# Patient Record
Sex: FEMALE | Race: WHITE | Employment: UNEMPLOYED | URBAN - METROPOLITAN AREA
[De-identification: names, ages, dates, MRNs, and addresses within clinical notes are randomized per-mention and may not be internally consistent; named-entity substitution may affect disease eponyms.]

---
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
## 2019-09-26 ENCOUNTER — OFFICE VISIT (OUTPATIENT)
Dept: OBGYN CLINIC | Age: 19
End: 2019-09-26

## 2019-09-26 VITALS
SYSTOLIC BLOOD PRESSURE: 139 MMHG | BODY MASS INDEX: 39.7 KG/M2 | HEIGHT: 66 IN | HEART RATE: 62 BPM | WEIGHT: 247 LBS | DIASTOLIC BLOOD PRESSURE: 72 MMHG

## 2019-09-26 DIAGNOSIS — N94.3 PMS (PREMENSTRUAL SYNDROME): ICD-10-CM

## 2019-09-26 DIAGNOSIS — Z01.419 ENCOUNTER FOR GYNECOLOGICAL EXAMINATION: Primary | ICD-10-CM

## 2019-09-26 DIAGNOSIS — N94.6 DYSMENORRHEA: ICD-10-CM

## 2019-09-26 DIAGNOSIS — Z11.3 SCREENING EXAMINATION FOR VENEREAL DISEASE: ICD-10-CM

## 2019-09-26 PROBLEM — E66.01 SEVERE OBESITY (HCC): Status: ACTIVE | Noted: 2019-09-26

## 2019-09-26 RX ORDER — LEVOTHYROXINE SODIUM 50 UG/1
TABLET ORAL
COMMUNITY
Start: 2019-09-24

## 2019-09-26 NOTE — PATIENT INSTRUCTIONS
Well Visit, Ages 25 to 48: Care Instructions  Your Care Instructions    Physical exams can help you stay healthy. Your doctor has checked your overall health and may have suggested ways to take good care of yourself. He or she also may have recommended tests. At home, you can help prevent illness with healthy eating, regular exercise, and other steps. Follow-up care is a key part of your treatment and safety. Be sure to make and go to all appointments, and call your doctor if you are having problems. It's also a good idea to know your test results and keep a list of the medicines you take. How can you care for yourself at home? · Reach and stay at a healthy weight. This will lower your risk for many problems, such as obesity, diabetes, heart disease, and high blood pressure. · Get at least 30 minutes of physical activity on most days of the week. Walking is a good choice. You also may want to do other activities, such as running, swimming, cycling, or playing tennis or team sports. Discuss any changes in your exercise program with your doctor. · Do not smoke or allow others to smoke around you. If you need help quitting, talk to your doctor about stop-smoking programs and medicines. These can increase your chances of quitting for good. · Talk to your doctor about whether you have any risk factors for sexually transmitted infections (STIs). Having one sex partner (who does not have STIs and does not have sex with anyone else) is a good way to avoid these infections. · Use birth control if you do not want to have children at this time. Talk with your doctor about the choices available and what might be best for you. · Protect your skin from too much sun. When you're outdoors from 10 a.m. to 4 p.m., stay in the shade or cover up with clothing and a hat with a wide brim. Wear sunglasses that block UV rays. Even when it's cloudy, put broad-spectrum sunscreen (SPF 30 or higher) on any exposed skin.   · See a dentist one or two times a year for checkups and to have your teeth cleaned. · Wear a seat belt in the car. Follow your doctor's advice about when to have certain tests. These tests can spot problems early. For everyone  · Cholesterol. Have the fat (cholesterol) in your blood tested after age 21. Your doctor will tell you how often to have this done based on your age, family history, or other things that can increase your risk for heart disease. · Blood pressure. Have your blood pressure checked during a routine doctor visit. Your doctor will tell you how often to check your blood pressure based on your age, your blood pressure results, and other factors. · Vision. Talk with your doctor about how often to have a glaucoma test.  · Diabetes. Ask your doctor whether you should have tests for diabetes. · Colon cancer. Your risk for colorectal cancer gets higher as you get older. Some experts say that adults should start regular screening at age 48 and stop at age 76. Others say to start before age 48 or continue after age 76. Talk with your doctor about your risk and when to start and stop screening. For women  · Breast exam and mammogram. Talk to your doctor about when you should have a clinical breast exam and a mammogram. Medical experts differ on whether and how often women under 50 should have these tests. Your doctor can help you decide what is right for you. · Cervical cancer screening test and pelvic exam. Begin with a Pap test at age 24. The test often is part of a pelvic exam. Starting at age 27, you may choose to have a Pap test, an HPV test, or both tests at the same time (called co-testing). Talk with your doctor about how often to have testing. · Tests for sexually transmitted infections (STIs). Ask whether you should have tests for STIs. You may be at risk if you have sex with more than one person, especially if your partners do not wear condoms.   For men  · Tests for sexually transmitted infections (STIs). Ask whether you should have tests for STIs. You may be at risk if you have sex with more than one person, especially if you do not wear a condom. · Testicular cancer exam. Ask your doctor whether you should check your testicles regularly. · Prostate exam. Talk to your doctor about whether you should have a blood test (called a PSA test) for prostate cancer. Experts differ on whether and when men should have this test. Some experts suggest it if you are older than 39 and are -American or have a father or brother who got prostate cancer when he was younger than 72. When should you call for help? Watch closely for changes in your health, and be sure to contact your doctor if you have any problems or symptoms that concern you. Where can you learn more? Go to http://meghan-alis.info/. Enter P072 in the search box to learn more about \"Well Visit, Ages 25 to 48: Care Instructions. \"  Current as of: December 13, 2018  Content Version: 12.2  © 4081-0197 [employer]. Care instructions adapted under license by [employer] (which disclaims liability or warranty for this information). If you have questions about a medical condition or this instruction, always ask your healthcare professional. [address] any warranty or liability for your use of this information.   MyChart Help Desk: [phone]

## 2019-09-26 NOTE — PROGRESS NOTES
Annual exam ages 21-44  (NEW PATIENT)    Samantha Szymanski is a [de-identified] P5,  22 y.o. female [facility] Patient's last menstrual period was 2019 (exact date). , who presents for her annual checkup. This is patient's first GYN Exam.     ADDITIONAL CONCERNS:  She is having no significant problems. With regard to the Gardasil vaccine, she has not received it yet. Menstrual status:    Her periods are light, moderate in flow. She using pads or tampons per day, and changing them every 3-4 hrs., usually regular and last 26-30 days. Menarche at 15-15 yo. She has dysmenorrhea. She has premenstrual symptoms - mood swings, breast tenderness. Contraception:    The current method of family planning is condoms most of the time. Sexual history:     She  reports that she currently engages in sexual activity and has had partner(s) who are Male. She reports using the following method of birth control/protection: Condom. .        Pap and Mammogram History:    Has never had a pap smear prior to today. The patient has never had a mammogram.    Breast Cancer History/Substance Abuse: Negative     Past Medical History:   Diagnosis Date    HPV vaccine counseling     Never rec'd Gardasil series    Hypothyroidism 2018     History reviewed. No pertinent surgical history. OB History    Para Term  AB Living   0 0 0 0 0 0   SAB TAB Ectopic Molar Multiple Live Births   0 0 0 0 0 0   Obstetric Comments   Menarche at age 13 or 13yrs old. Current Outpatient Medications   Medication Sig Dispense Refill    levothyroxine (SYNTHROID) 50 mcg tablet        Allergies: Patient has no known allergies.    Social History     Socioeconomic History    Marital status: SINGLE     Spouse name: Not on file    Number of children: Not on file    Years of education: Not on file    Highest education level: Not on file   Occupational History    Not on file   Social Needs    Financial resource strain: Not on file    Food insecurity:     Worry: Not on file     Inability: Not on file    Transportation needs:     Medical: Not on file     Non-medical: Not on file   Tobacco Use    Smoking status: Never Smoker    Smokeless tobacco: Never Used    Tobacco comment: Denies vaping   Substance and Sexual Activity    Alcohol use: Yes    Drug use: Never    Sexual activity: Yes     Partners: Male     Birth control/protection: Condom   Lifestyle    Physical activity:     Days per week: Not on file     Minutes per session: Not on file    Stress: Not on file   Relationships    Social connections:     Talks on phone: Not on file     Gets together: Not on file     Attends Cheondoism service: Not on file     Active member of club or organization: Not on file     Attends meetings of clubs or organizations: Not on file     Relationship status: Not on file    Intimate partner violence:     Fear of current or ex partner: Not on file     Emotionally abused: Not on file     Physically abused: Not on file     Forced sexual activity: Not on file   Other Topics Concern    Not on file   Social History Narrative    Not on file     Tobacco History:  reports that she has never smoked. She has never used smokeless tobacco.  Alcohol Abuse:  reports that she drinks alcohol. Drug Abuse:  reports that she does not use drugs.     Patient Active Problem List   Diagnosis Code    Hypothyroidism E03.9    Severe obesity ([facility] [address] E66.01     Family History   Problem Relation Age of Onset    Diabetes Mother     Ovarian Cancer Paternal Grandmother          at 59yo    Emphysema Paternal Grandmother        Review of Systems - History obtained from the patient  Constitutional: negative for weight loss, fever, night sweats  HEENT: negative for hearing loss, earache, congestion, snoring, sorethroat  CV: negative for chest pain, palpitations, edema  Resp: negative for cough, shortness of breath, wheezing  GI: negative for change in bowel habits, abdominal pain, black or bloody stools  : negative for frequency, dysuria, hematuria, vaginal discharge  MSK: negative for back pain, joint pain, muscle pain  Breast: negative for breast lumps, nipple discharge, galactorrhea  Skin :negative for itching, rash, hives  Neuro: negative for dizziness, headache, confusion, weakness  Psych: negative for anxiety, depression, change in mood  Heme/lymph: negative for bleeding, bruising, pallor    Physical Exam    Visit Vitals  /72 (BP 1 Location: Right arm, BP Patient Position: Sitting)   Pulse 62   Ht 5' 6\" (1.676 m)   Wt 247 lb (112 kg)   LMP 09/12/2019 (Exact Date)   BMI 39.87 kg/m²       Constitutional  · Appearance: well-nourished, well developed, alert, in no acute distress    HENT  · Head and Face: appears normal    Neck  · Inspection/Palpation: normal appearance, no masses or tenderness  · Lymph Nodes: no lymphadenopathy present  · Thyroid: gland size normal, nontender, no nodules or masses present on palpation    Chest  · Respiratory Effort: breathing unlabored  · Auscultation: normal breath sounds    Cardiovascular  · Heart:  · Auscultation: regular rate and rhythm without murmur    Breasts  · Inspection of Breasts: breasts symmetrical, no skin changes, no discharge present, nipple appearance normal, no skin retraction present  · Palpation of Breasts and Axillae: no masses present on palpation, no breast tenderness  · Axillary Lymph Nodes: no lymphadenopathy present    Gastrointestinal  · Abdominal Examination: abdomen non-tender to palpation, normal bowel sounds, no masses present  · Liver and spleen: no hepatomegaly present, spleen not palpable  · Hernias: no hernias identified    Genitourinary  · External Genitalia: normal appearance for age, no discharge present, no tenderness present, no inflammatory lesions present, no masses present, no atrophy present  · Vagina: normal vaginal vault without central or paravaginal defects, no discharge present, no inflammatory lesions present, no masses present  · Bladder: non-tender to palpation  · Urethra: appears normal  · Cervix: normal   · Uterus: normal size, shape and consistency  · Adnexa: no adnexal tenderness present, no adnexal masses present  · Perineum: perineum within normal limits, no evidence of trauma, no rashes or skin lesions present  · Anus: anus within normal limits, no hemorrhoids present  · Inguinal Lymph Nodes: no lymphadenopathy present    Skin  · General Inspection: no rash, no lesions identified    Neurologic/Psychiatric  · Mental Status:  · Orientation: grossly oriented to person, place and time  · Mood and Affect: mood normal, affect appropriate        Assessment & Plan:  · Routine gynecologic examination. Pap done. · NuSwab screening <26yo. · Dysmenorrhea, PMS. Interested in [address]. [address]3 no RF. Risk and benefits reviewed, including thromboembolic events. 1st Sunday start, backup method until second pack, continue for STD prevention. Handout given. RTO 3months for f/u. · At end of visit, mentioned \"white spot\" on labia (she forgot to mention while taking hx, did not notice at time of exam). Has picture on her phone that she showed me -- blocked duct, possible sebaceous cyst.  Rec heat, sitz baths. Can reassess when she returns for f/u. · Her current medical status is satisfactory with no evidence of significant gynecologic issues. · Counseled re: diet, exercise, healthy lifestyle  · Return for yearly wellness visits  · Gardisil counseling provided. Declines today, she will consider. · Patient verbalized understanding      Orders Placed This Encounter    CT/NG/T.VAGINALIS AMPLIFICATION     Order Specific Question:   Specimen source     Answer:   Vaginal [516]    norethindrone-e estradiol-iron (JUNEL FE 24) 1 mg-20 mcg (24)/75 mg (4) tab     Sig: Take 1 Tab by mouth daily.      Dispense:  3 Package     Refill:  0    PAP, IG, RFX HPV ASCUS ([id number])

## 2019-09-30 LAB
C TRACH RRNA SPEC QL NAA+PROBE: NEGATIVE
N GONORRHOEA RRNA SPEC QL NAA+PROBE: NEGATIVE
T VAGINALIS DNA SPEC QL NAA+PROBE: NEGATIVE

## 2019-10-04 PROBLEM — R87.619 ABNORMAL PAP SMEAR OF CERVIX: Status: ACTIVE | Noted: 2019-10-04

## 2019-10-04 LAB
CYTOLOGIST CVX/VAG CYTO: ABNORMAL
CYTOLOGY CVX/VAG DOC CYTO: ABNORMAL
CYTOLOGY CVX/VAG DOC THIN PREP: ABNORMAL
DX ICD CODE: ABNORMAL
DX ICD CODE: ABNORMAL
LABCORP, 190119: ABNORMAL
Lab: ABNORMAL
OTHER STN SPEC: ABNORMAL
PATHOLOGIST CVX/VAG CYTO: ABNORMAL
STAT OF ADQ CVX/VAG CYTO-IMP: ABNORMAL

## 2019-12-19 NOTE — PROGRESS NOTES
Contraception evaluation/followup    The patient is a 22 y.o.,  Patient's last menstrual period was 2019 (approximate). .     She is here for contraceptive counseling/folllow up. Her current method of family planning is OCP (estrogen/progesterone). The patient is sexually active. She states she is satisfied with her current form of contraception. Sen for AE in Sept.  Reported dysmenorrhea, PMS sx. Started on Junel 24. Doing well. Had some BTB first 1.5 packs -> resolved. No N/V. H/o HA prior to OCPs, no change. Would like to continue. At end of AE (after exam completed) in Sept, reported white vaginal bump. Told probably sebaceous cyst.  Adv sitz baths. Has done occasionally, but not consistently. Asymptomatic, has not changed in size. Her previous menses she describes as moderate lasting up to 6 days. Pad or tampon count: changes every 4 hours. Preventive Medicine History: Last Pap smear:patient has never had a pap test.     Past Medical History:   Diagnosis Date    Abnormal Pap smear of cervix 2019    LGSIL -> Rpt pap in 1yr     HPV vaccine counseling     Never rec'd Gardasil series    Hypothyroidism 2018        History reviewed. No pertinent surgical history. Social History     Occupational History    Not on file   Tobacco Use    Smoking status: Never Smoker    Smokeless tobacco: Never Used    Tobacco comment: Denies vaping   Substance and Sexual Activity    Alcohol use: Yes    Drug use: Never    Sexual activity: Yes     Partners: Male     Birth control/protection: Condom     Family History   Problem Relation Age of Onset    Diabetes Mother     Ovarian Cancer Paternal Grandmother          at 61yo    Emphysema Paternal Grandmother        No Known Allergies  Prior to Admission medications    Medication Sig Start Date End Date Taking?  Authorizing Provider   norethindrone-e estradiol-iron (JUNEL FE 24) 1 mg-20 mcg (24)/75 mg (4) tab Take 1 Tab by mouth daily. 12/20/19  Yes Sandra Navarro MD   levothyroxine (SYNTHROID) 50 mcg tablet  9/24/19  Yes Provider, Historical        Review of Systems - History obtained from the patient  Constitutional: negative for weight loss, fever, night sweats  HEENT: negative for hearing loss, earache, congestion, snoring, sorethroat  CV: negative for chest pain, palpitations, edema  Resp: negative for cough, shortness of breath, wheezing  Breast: negative for breast lumps, nipple discharge, galactorrhea  GI: negative for change in bowel habits, abdominal pain, black or bloody stools  : negative for frequency, dysuria, hematuria  MSK: negative for back pain, joint pain, muscle pain  Skin: negative for itching, rash, hives  Neuro: negative for dizziness, headache, confusion, weakness  Psych: negative for anxiety, depression, change in mood  Heme/lymph: negative for bleeding, bruising, pallor      Objective:    Visit Vitals  /69   Wt 251 lb (113.9 kg)   LMP 12/05/2019 (Approximate)   BMI 40.51 kg/m²          PHYSICAL EXAMINATION    Constitutional  · Appearance: well-nourished, well developed, alert, in no acute distress    HENT  · Head and Face: appears normal      Gastrointestinal  · Abdominal Examination: abdomen non-tender to palpation, no masses present  · Liver and spleen: no hepatomegaly present, spleen not palpable  · Hernias: no hernias identified    Genitourinary  · External Genitalia: small sebaceous cyst, otherwise normal appearance for age, no discharge present, no tenderness present, no inflammatory lesions present, no masses present, no atrophy present            · Urethra: appears normal  · Perineum: perineum within normal limits, no evidence of trauma, no rashes or skin lesions present  · Anus: anus within normal limits, no hemorrhoids present    Skin  · General Inspection: no rash, no lesions identified    Neurologic/Psychiatric  · Mental Status:  · Orientation: grossly oriented to person, place and time  · Mood and Affect: mood normal, affect appropriate    Assessment:   H/o dysmenorrhea, PMS. Doing well on OCPs. Normotensive. Wishes to continue. Sebaceous cyst    Plan:   Jose Wren 24 #3 RFx3  Reassured pt sebaceous cyst is benign. No intervention necessary unless becomes symptomatic. May benefit from sitz baths. AE due in Sept, adv to schedule. Orders Placed This Encounter    norethindrone-e estradiol-iron (JUNEL FE 24) 1 mg-20 mcg (24)/75 mg (4) tab     Sig: Take 1 Tab by mouth daily.      Dispense:  3 Package     Refill:  3

## 2019-12-20 ENCOUNTER — OFFICE VISIT (OUTPATIENT)
Dept: OBGYN CLINIC | Age: 19
End: 2019-12-20

## 2019-12-20 VITALS — SYSTOLIC BLOOD PRESSURE: 129 MMHG | WEIGHT: 251 LBS | DIASTOLIC BLOOD PRESSURE: 69 MMHG | BODY MASS INDEX: 40.51 KG/M2

## 2019-12-20 DIAGNOSIS — Z30.41 ENCOUNTER FOR SURVEILLANCE OF CONTRACEPTIVE PILLS: Primary | ICD-10-CM

## 2019-12-20 DIAGNOSIS — Z87.42 HISTORY OF DYSMENORRHEA: ICD-10-CM

## 2019-12-20 DIAGNOSIS — L72.3 SEBACEOUS CYST: ICD-10-CM

## 2020-03-30 RX ORDER — DROSPIRENONE AND ETHINYL ESTRADIOL 0.02-3(28)
1 KIT ORAL DAILY
Qty: 3 PACKAGE | Refills: 1 | Status: SHIPPED | OUTPATIENT
Start: 2020-03-30 | End: 2021-11-12

## 2021-11-05 ENCOUNTER — TELEPHONE (OUTPATIENT)
Dept: OBGYN CLINIC | Age: 21
End: 2021-11-05

## 2021-11-05 NOTE — TELEPHONE ENCOUNTER
~6w4d  DM pt    Pt calling with c/o vaginal bleeding. Pt states she is currently wearing a panty liner, but not soaking it. Pt states its bright red blood, no clots, that started today. Pt states there is light cramping, if any. This RN advised pt to increase PO intake and rest and that this is not uncommon this early in pregnancy but would reach out to [place] [id number] for other recommendations. This RN also advised pt to seek eval at the ED if bleeding increases to where she has to change a pad every 30 mins-1 hour or increased cramping that is no longer tolerable.

## 2021-11-09 ENCOUNTER — TELEPHONE (OUTPATIENT)
Dept: OBGYN CLINIC | Age: 21
End: 2021-11-09

## 2021-11-09 NOTE — TELEPHONE ENCOUNTER
This nurse called the patient back to check in on her and patient reports the bleeding has subsided    Patient was advised to continue to monitor her symptoms and increase her po fluids    Patient verbalized understanding.

## 2021-11-09 NOTE — TELEPHONE ENCOUNTER
Call received at 8:30am      19year old patient last seen in the office on 12/20/2019    Patient reports LMP is 9/20/2021 7w1d pregnant and has first ob visit on 11/22/2021    Patient has some bleeding see note on 11/5/2021  Patient report this am she got up and when wiping she noted dark red blood when wiping and slight spotting on a panty liner,( patient states the spotting seems more than on Friday)    Patient reports only slight cramping     Patient denies recent intercourse and reports BM yesterday am.     Patient was provided pain and bleeding precautions and is drinking upwards of a gallon of water    Patient was advised to increase po fluids, rest and can take tylenol    Patient is concerned that the bleeding has started back again after stopping    Patient was placed on the schedule to be seen for ultrasound at 7:30am on 11/12.2021 and then MD at 9:00am    Patient will monitor her symptoms and was advised if she feels she needs to be checked to go to the er. Patient verbalized understanding.       debra    Other recommendations    Please advise  Thank you

## 2021-11-12 ENCOUNTER — OFFICE VISIT (OUTPATIENT)
Dept: OBGYN CLINIC | Age: 21
End: 2021-11-12

## 2021-11-12 VITALS
DIASTOLIC BLOOD PRESSURE: 67 MMHG | HEART RATE: 55 BPM | WEIGHT: 261 LBS | HEIGHT: 66 IN | BODY MASS INDEX: 41.95 KG/M2 | SYSTOLIC BLOOD PRESSURE: 128 MMHG

## 2021-11-12 DIAGNOSIS — O20.0 THREATENED ABORTION: Primary | ICD-10-CM

## 2021-11-12 PROCEDURE — 99212 OFFICE O/P EST SF 10 MIN: CPT | Performed by: OBSTETRICS & GYNECOLOGY

## 2021-11-12 NOTE — PROGRESS NOTES
Problem Visit-Complete    Chief Complaint   Ultrasound (viability for bleeding early OB)      ADELINE Pizarro is a 19 y.o. female who presents for the evaluation of bleeding in early pregnancy. Patient's last menstrual period was 2021. -> 7+4, NATIVIDAD=22    Has had a couple of episodes of bleeding. First was bright red. Second was darker but had clot. The patient denies any bleeding since 19. Ultrasound today showed:  TV ULTRASOUND PERFORMED  A SINGLE VIABLE 7W3D IUP IS SEEN WITH NORMAL CARDIAC RHYTHM. GESTATIONAL AGE BASED ON TODAYS ULTRASOUND. A NORMAL YOLK SAC IS SEEN. RIGHT OVARY APPEARS WITHIN NORMAL LIMITS. A CL CYST IS SEEN. LEFT ADNEXA APPEARS WITHIN NORMAL LIMITS. NO FREE FLUID SEEN IN THE CDS. Past Medical History:   Diagnosis Date    Abnormal Pap smear of cervix 2019    LGSIL -> Rpt pap in 1yr     HPV vaccine counseling     Never rec'd Gardasil series    Hypothyroidism 2018     History reviewed. No pertinent surgical history. Social History     Occupational History    Not on file   Tobacco Use    Smoking status: Never Smoker    Smokeless tobacco: Never Used    Tobacco comment: Denies vaping   Vaping Use    Vaping Use: Never used   Substance and Sexual Activity    Alcohol use: Yes    Drug use: Never    Sexual activity: Yes     Partners: Male     Birth control/protection: Condom     Family History   Problem Relation Age of Onset    Diabetes Mother     Ovarian Cancer Paternal Grandmother          at 61yo    Emphysema Paternal Grandmother        No Known Allergies  Prior to Admission medications    Medication Sig Start Date End Date Taking?  Authorizing Provider   levothyroxine (SYNTHROID) 50 mcg tablet  19  Yes Provider, Historical            Objective:  Visit Vitals  /67   Pulse (!) 55   Ht 5' 6\" (1.676 m)   Wt 261 lb (118.4 kg)   LMP 2021   BMI 42.13 kg/m²       Physical Exam:     Constitutional  · Appearance: well-nourished, well developed, alert, in no acute distress    HENT  · Head and Face: appears normal    Skin  · General Inspection: no rash, no lesions identified    Neurologic/Psychiatric  · Mental Status:  · Orientation: grossly oriented to person, place and time  · Mood and Affect: mood normal, affect appropriate    Assessment:  Threatened AB  US today shows viable IUP, cwd, nl FHR. Plan:   Keep EOB as scheduled. Will rpt US as scheduled to assess for interval growth and viability.

## 2021-11-22 ENCOUNTER — ROUTINE PRENATAL (OUTPATIENT)
Dept: OBGYN CLINIC | Age: 21
End: 2021-11-22

## 2021-11-22 VITALS
HEART RATE: 56 BPM | SYSTOLIC BLOOD PRESSURE: 129 MMHG | BODY MASS INDEX: 42.77 KG/M2 | DIASTOLIC BLOOD PRESSURE: 69 MMHG | WEIGHT: 265 LBS

## 2021-11-22 DIAGNOSIS — N92.6 MISSED MENSES: Primary | ICD-10-CM

## 2021-11-22 DIAGNOSIS — Z34.00 SUPERVISION OF NORMAL FIRST PREGNANCY, ANTEPARTUM: ICD-10-CM

## 2021-11-22 PROCEDURE — 0502F SUBSEQUENT PRENATAL CARE: CPT | Performed by: OBSTETRICS & GYNECOLOGY

## 2021-11-22 NOTE — PROGRESS NOTES
Current pregnancy history:    Valerie Reilly is a 19 y.o. female who presents for the evaluation of pregnancy. Patient's last menstrual period was 2021. Seen a couple of weeks ago for bleeding. US at that visit was nl. No additional bleeding since then. LMP history:  The date of her LMP is certain. Her menstrual cycles are regular and occur approximately every 28 days and range from 3 to 5 days. The last menses did  last the usual number of days. A urine pregnancy test was positive 4 weeks ago. She was not on the pill at conception. Based on her LMP her EGA is 9 weeks and 0 days giving an EDC of 22. Ultrasound data:  She had an ultrasound done by the ultrasound tech today which revealed a viable castellanos pregnancy with a gestational age of 10 weeks and 2 days giving an EDC of 22. Ultrasound details:    TA ULTRASOUND PERFORMED  A SINGLE VIABLE 9W2D IUP IS SEEN WITH NORMAL CARDIAC RHYTHM. GESTATIONAL AGE BASED ON TODAYS ULTRASOUND. A NORMAL YOLK SAC IS SEEN. RIGHT OVARY APPEARS WITHIN NORMAL LIMITS. LEFT OVARY APPEARS WITHIN NORMAL LIMITS. NO FREE FLUID SEEN IN THE CDS. Pregnancy symptoms:    Since her LMP she has experienced  urinary frequency, breast tenderness, fatigue and nausea. She has not been vomiting over the last few weeks. Associated signs and symptoms which she denies: dysuria, discharge, vaginal bleeding. She states she has maintained weight. Relevant past pregnancy history:  She has the following pregnancy history: none  She has no history of  delivery. Relevant past medical history:(relevant to this pregnancy):   - hypothyroidism, followed by PCP. Re ports labs drawn recently     Pap/Occupational history:  Last pap smear: 19  Results: LGSIL ENCOMPASSING HUMAN   PAPILLOMAVIRUS /MILD DYSPLASIA/CIN1). Did not RTO for rpt as advised. Her occupation is: .      Substance history:  Negative for alcohol, tobacco and street drugs. Positive for nothing. Exposure history: There is/are no indoor cat/s in the home. The patient was instructed to not change the cat litter. She admits close contact with children on a regular basis. She has had chicken pox or the vaccine in the past.   Patient denies issues with domestic violence. Genetic Screening/Teratology Counseling: (Includes patient, baby's father, or anyone in either family with:)  3.  Patient's age >/= 28 [facility]?-- no  .   2. [facility]semia ([facility] [place] [address], or  background): MCV<80?--no.     3.  Neural tube defect (meningomyelocele, spina bifida, anencephaly)?--no.   4.  Congenital heart defect?--no.  5.  Down syndrome?--no.   6.  Sinan-Sachs ([employer], [place])?--no.   7.  Canavan's Disease?--no.   8.  Familial Dysautonomia?--no.   9.  Sickle cell disease or trait ()? --no   The patient has not been tested for sickle trait  10. Hemophilia or other blood disorders?--no. 11.  Muscular dystrophy?--no. 12.  Cystic fibrosis?--no. 13.  Devang's Chorea?--no. 14.  Mental retardation/autism (if yes was person tested for Fragile X)?-- FOB had great uncle with ?MR, does not much about him  13. Other inherited genetic or chromosomal disorder?--no. 12.  Maternal metabolic disorder (DM, PKU, etc)?--no. 17.  Patient or FOB with a child with a birth defect not listed above?--no.  17a. Patient or FOB with a birth defect themselves?--no. 18.  Recurrent pregnancy loss, or stillbirth?--no. 19.  Any medications since LMP other than prenatal vitamins (include vitamins, supplements, OTC meds, drugs, alcohol)?--no. 20.  Any other genetic/environmental exposure to discuss?--no. Infection History:  1. Lives with someone with TB or TB exposed?--no.   2.  Patient or partner has history of genital herpes?--no.  3.  Rash or viral illness since LMP?--no.    4.  History of STD (GC, CT, HPV, syphilis, HIV)? --no   5. Other: OTHER?       Past Medical History: Diagnosis Date    Abnormal Pap smear of cervix 2019    LGSIL -> Rpt pap in 1yr     HPV vaccine counseling     Never rec'd Gardasil series    Hypothyroidism 2018     No past surgical history on file. Social History     Occupational History    Not on file   Tobacco Use    Smoking status: Never Smoker    Smokeless tobacco: Never Used    Tobacco comment: Denies vaping   Vaping Use    Vaping Use: Never used   Substance and Sexual Activity    Alcohol use: Yes    Drug use: Never    Sexual activity: Yes     Partners: Male     Birth control/protection: Condom     Family History   Problem Relation Age of Onset    Diabetes Mother     Ovarian Cancer Paternal Grandmother          at 61yo    Emphysema Paternal Grandmother        No Known Allergies  Prior to Admission medications    Medication Sig Start Date End Date Taking? Authorizing Provider   PNV DK.12/CLAUDETTEJFRIDA fum/folic ac (PRENATAL PO) Take  by mouth.    Yes Provider, Historical   levothyroxine (SYNTHROID) 50 mcg tablet  19  Yes Provider, Historical        Review of Systems: History obtained from the patient  Constitutional: negative for weight loss, fever, night sweats  HEENT: negative for hearing loss, earache, congestion, snoring, sorethroat  CV: negative for chest pain, palpitations, edema  Resp: negative for cough, shortness of breath, wheezing  Breast: negative for breast lumps, nipple discharge, galactorrhea  GI: negative for change in bowel habits, abdominal pain, black or bloody stools  : negative for dysuria, hematuria, vaginal discharge  MSK: negative for back pain, joint pain, muscle pain  Skin: negative for itching, rash, hives  Neuro: negative for dizziness, headache, confusion, weakness  Psych: negative for anxiety, depression, change in mood  Heme/lymph: negative for bleeding, bruising, pallor    Objective:  Visit Vitals  /69   Pulse (!) 56   Wt 265 lb (120.2 kg)   LMP 2021   BMI 42.77 kg/m²       Physical Exam: PHYSICAL EXAMINATION    Constitutional  · Appearance: well-nourished, well developed, alert, in no acute distress    HENT  · Head  · Face: appears normal  · Eyes: appear normal  · Ears: normal  · Mouth: normal  · Lips: no lesions    Neck  · Inspection/Palpation: normal appearance, no masses or tenderness  · Lymph Nodes: no lymphadenopathy present  · Thyroid: gland size normal, nontender, no nodules or masses present on palpation    Chest  · Respiratory Effort: breathing unlabored  · Auscultation: normal breath sounds    Cardiovascular  · Heart:  · Auscultation: regular rate and rhythm without murmur    Breasts  · Inspection of Breasts: breasts symmetrical, no skin changes, no discharge present, nipple appearance normal, no skin retraction present  · Palpation of Breasts and Axillae: no masses present on palpation, no breast tenderness  · Axillary Lymph Nodes: no lymphadenopathy present    Gastrointestinal  · Abdominal Examination: abdomen non-tender to palpation, normal bowel sounds, no masses present  · Liver and spleen: no hepatomegaly present, spleen not palpable  · Hernias: no hernias identified    Genitourinary  · External Genitalia: normal appearance for age, no discharge present, no tenderness present, no inflammatory lesions present, no masses present, no atrophy present  · Vagina: normal vaginal vault without central or paravaginal defects, no discharge present, no inflammatory lesions present, no masses present  · Bladder: non-tender to palpation  · Urethra: appears normal  · Cervix: normal   · Uterus: enlarged 8-10 wk, normal shape, soft  · Adnexa: no adnexal tenderness present, no adnexal masses present  · Perineum: perineum within normal limits, no evidence of trauma, no rashes or skin lesions present  · Anus: anus within normal limits, no hemorrhoids present  · Inguinal Lymph Nodes: no lymphadenopathy present    Skin  · General Inspection: no rash, no lesions identified    Neurologic/Psychiatric  · Mental Status:  · Orientation: grossly oriented to person, place and time  · Mood and Affect: mood normal, affect appropriate    Assessment:   Intrauterine pregnancy:  - U=D. LMP for dating: LMP=9/20/21 -> NATIVIDAD=6/27/21  - hypothyroidism, followed by PCP  - prev pap LGSIL -> rpt pap today  - MACI 3wks, Panorama, Horizon        Plan:     · Offered CF testing, CVS, Nuchal Translucency, MSAFP, amnio, and discussed NIPT  · Course of pregnancy discussed including visit schedule, routine U/S, glucola testing, etc.  · Avoid alcoholic beverages and illicit/recreational drugs use  · Take prenatal vitamins or folic acid daily. · Hospital and practice style discussed with coverage system. · Discussed nutrition, toxoplasmosis precautions, sexual activity, exercise, need for influenza vaccine, environmental and work hazards, travel advice, screen for domestic violence, need for seat belts. · Discussed seafood, unpasteurized dairy products, deli meat, artificial sweeteners, and caffeine. · Information on prenatal classes/breastfeeding given. · Patient encouraged not to smoke. · Discussed current prescription drug use. Given medication list.  · Discussed the use of over the counter medications and chemicals. · Pt understands risk of hemorrhage during pregnancy and post delivery and would accept blood products if necessary in life-threatening emergencies      Handouts given to pt. Orders Placed This Encounter    CULTURE, URINE     Standing Status:   Future     Number of Occurrences:   1     Standing Expiration Date:   11/22/2022    HEP B SURFACE AG     Standing Status:   Future     Number of Occurrences:   1     Standing Expiration Date:   11/22/2022    HIV SCREEN, 4TH GEN.  W/REFLEX CONFIRM     Standing Status:   Future     Number of Occurrences:   1     Standing Expiration Date:   11/22/2022    CBC W/O DIFF     Standing Status:   Future     Number of Occurrences:   1     Standing Expiration Date:   11/22/2022    RUBELLA AB, IGG     Standing Status:   Future     Number of Occurrences:   1     Standing Expiration Date:   11/22/2022    RPR     Standing Status:   Future     Number of Occurrences:   1     Standing Expiration Date:   11/22/2022    HEPATITIS C AB     Standing Status:   Future     Number of Occurrences:   1     Standing Expiration Date:   11/22/2022    CT/NG/T.VAGINALIS AMPLIFICATION     Order Specific Question:   Specimen source     Answer:   Vagina [280]    TYPE & SCREEN     ENTER SURGERY DATE IF FOR PRE-OP TESTING. Standing Status:   Future     Number of Occurrences:   1     Standing Expiration Date:   11/22/2022     Order Specific Question:   Has patient been transfused or pregnant in the last 3 mos. ? Answer:   Unknown    PNV FQ.16/TSFUXQY fum/folic ac (PRENATAL PO)     Sig: Take  by mouth.  PAP IG, RFX APTIMA HPV ASCUS ([id number])     Standing Status:   Future     Number of Occurrences:   1     Standing Expiration Date:   11/22/2022     Order Specific Question:   Pap Source? Answer:   Cervical and Endocervical     Order Specific Question:   Total Hysterectomy? Answer:   No     Order Specific Question:   Supracervical Hysterectomy? Answer:   No     Order Specific Question:   Post Menopausal?     Answer:   No     Order Specific Question:   Hormone Therapy? Answer:   No     Order Specific Question:   IUD? Answer:   No     Order Specific Question:   Abnormal Bleeding? Answer:   No     Order Specific Question:   Pregnant     Answer:   Yes     Order Specific Question:   Post Partum? Answer:    No

## 2021-11-23 LAB
ABO + RH BLD: NORMAL
BACTERIA SPEC CULT: NORMAL
BLOOD BANK CMNT PATIENT-IMP: NORMAL
BLOOD GROUP ANTIBODIES SERPL: NORMAL
ERYTHROCYTE [DISTWIDTH] IN BLOOD BY AUTOMATED COUNT: 13.6 % (ref 11.5–14.5)
HBV SURFACE AG SER QL: 0.21 INDEX
HBV SURFACE AG SER QL: NEGATIVE
HCT VFR BLD AUTO: 39.5 % (ref 35–47)
HCV AB SERPL QL IA: NONREACTIVE
HGB BLD-MCNC: 12.1 G/DL (ref 11.5–16)
HIV 1+2 AB+HIV1 P24 AG SERPL QL IA: NONREACTIVE
HIV12 RESULT COMMENT, HHIVC: NORMAL
MCH RBC QN AUTO: 28.3 PG (ref 26–34)
MCHC RBC AUTO-ENTMCNC: 30.6 G/DL (ref 30–36.5)
MCV RBC AUTO: 92.3 FL (ref 80–99)
NRBC # BLD: 0 K/UL (ref 0–0.01)
NRBC BLD-RTO: 0 PER 100 WBC
PLATELET # BLD AUTO: 317 K/UL (ref 150–400)
PMV BLD AUTO: 9.9 FL (ref 8.9–12.9)
RBC # BLD AUTO: 4.28 M/UL (ref 3.8–5.2)
RPR SER QL: NONREACTIVE
RUBV IGG SER-IMP: REACTIVE
RUBV IGG SERPL IA-ACNC: 166.8 IU/ML
SERVICE CMNT-IMP: NORMAL
SPECIMEN EXP DATE BLD: NORMAL
WBC # BLD AUTO: 13.2 K/UL (ref 3.6–11)

## 2021-11-24 LAB
CYTOLOGIST CVX/VAG CYTO: NORMAL
CYTOLOGY CVX/VAG DOC CYTO: NORMAL
CYTOLOGY CVX/VAG DOC THIN PREP: NORMAL
DX ICD CODE: NORMAL
LABCORP, 190119: NORMAL
Lab: NORMAL
OTHER STN SPEC: NORMAL
STAT OF ADQ CVX/VAG CYTO-IMP: NORMAL

## 2021-11-25 LAB
C TRACH RRNA SPEC QL NAA+PROBE: NEGATIVE
N GONORRHOEA RRNA SPEC QL NAA+PROBE: NEGATIVE
SPECIMEN STATUS REPORT, ROLRST: NORMAL
T VAGINALIS DNA SPEC QL NAA+PROBE: NEGATIVE

## 2021-12-15 ENCOUNTER — ROUTINE PRENATAL (OUTPATIENT)
Dept: OBGYN CLINIC | Age: 21
End: 2021-12-15
Payer: COMMERCIAL

## 2021-12-15 VITALS
WEIGHT: 267 LBS | BODY MASS INDEX: 43.09 KG/M2 | SYSTOLIC BLOOD PRESSURE: 136 MMHG | HEART RATE: 64 BPM | DIASTOLIC BLOOD PRESSURE: 70 MMHG

## 2021-12-15 DIAGNOSIS — Z34.00 SUPERVISION OF NORMAL FIRST PREGNANCY, ANTEPARTUM: Primary | ICD-10-CM

## 2021-12-15 PROBLEM — Z34.90 PREGNANCY: Status: ACTIVE | Noted: 2021-12-15

## 2021-12-15 PROCEDURE — 0502F SUBSEQUENT PRENATAL CARE: CPT | Performed by: OBSTETRICS & GYNECOLOGY

## 2021-12-15 NOTE — PROGRESS NOTES
Panorama today, declines Horizon. Hypothyroid, managed by PCP, planning labs q trimester. MACI 4wks with AFP.  Plan 20wk US with MFM d/t BMI (43)

## 2021-12-30 ENCOUNTER — TELEPHONE (OUTPATIENT)
Dept: OBGYN CLINIC | Age: 21
End: 2021-12-30

## 2021-12-30 NOTE — TELEPHONE ENCOUNTER
19year old pregnant pt 14w3d calling in regards to getting her panorama results. Spoke with MD nurse and told this CMA that she was going to get MD to look at results and then she will message the pt back on [facility]. Notified pt and pt gave verbal understanding.

## 2022-01-18 LAB — NIPT, EXTERNAL: NORMAL

## 2022-03-19 PROBLEM — E66.01 SEVERE OBESITY (HCC): Status: ACTIVE | Noted: 2019-09-26

## 2022-03-19 PROBLEM — Z34.90 PREGNANCY: Status: ACTIVE | Noted: 2021-12-15

## 2022-03-20 PROBLEM — R87.619 ABNORMAL PAP SMEAR OF CERVIX: Status: ACTIVE | Noted: 2019-10-04

## 2023-12-16 ENCOUNTER — HOSPITAL ENCOUNTER (EMERGENCY)
Facility: HOSPITAL | Age: 23
Discharge: HOME | End: 2023-12-16
Attending: EMERGENCY MEDICINE
Payer: COMMERCIAL

## 2023-12-16 VITALS
BODY MASS INDEX: 41.18 KG/M2 | DIASTOLIC BLOOD PRESSURE: 67 MMHG | TEMPERATURE: 99.9 F | HEART RATE: 95 BPM | RESPIRATION RATE: 16 BRPM | OXYGEN SATURATION: 97 % | WEIGHT: 262.35 LBS | HEIGHT: 67 IN | SYSTOLIC BLOOD PRESSURE: 150 MMHG

## 2023-12-16 DIAGNOSIS — B34.9 VIRAL ILLNESS: Primary | ICD-10-CM

## 2023-12-16 DIAGNOSIS — R68.89 FLU-LIKE SYMPTOMS: ICD-10-CM

## 2023-12-16 LAB
FLUAV AG NPH QL IA: NEGATIVE
FLUBV AG NOSE QL IA: NEGATIVE

## 2023-12-16 PROCEDURE — 87804 INFLUENZA ASSAY W/OPTIC: CPT

## 2023-12-16 PROCEDURE — 6370000000 HC RX 637 (ALT 250 FOR IP): Performed by: EMERGENCY MEDICINE

## 2023-12-16 RX ORDER — ACETAMINOPHEN 500 MG
1000 TABLET ORAL
Status: COMPLETED | OUTPATIENT
Start: 2023-12-16 | End: 2023-12-16

## 2023-12-16 RX ORDER — BENZONATATE 100 MG/1
100-200 CAPSULE ORAL 3 TIMES DAILY PRN
Qty: 40 CAPSULE | Refills: 0 | Status: SHIPPED | OUTPATIENT
Start: 2023-12-16 | End: 2023-12-23

## 2023-12-16 RX ADMIN — ACETAMINOPHEN 1000 MG: 500 TABLET ORAL at 10:50

## 2023-12-16 ASSESSMENT — PAIN - FUNCTIONAL ASSESSMENT: PAIN_FUNCTIONAL_ASSESSMENT: NONE - DENIES PAIN

## 2023-12-16 NOTE — ED TRIAGE NOTES
GCS 15. Patient ambulatory to treatment area. Patient states last night she noticed chest congestion which turned into a cough. Patient states she woke up with a 101 fever this AM and body aches.

## 2024-03-25 ENCOUNTER — APPOINTMENT (OUTPATIENT)
Facility: HOSPITAL | Age: 24
End: 2024-03-25
Payer: COMMERCIAL

## 2024-03-25 ENCOUNTER — HOSPITAL ENCOUNTER (EMERGENCY)
Facility: HOSPITAL | Age: 24
Discharge: HOME | End: 2024-03-25
Attending: EMERGENCY MEDICINE
Payer: COMMERCIAL

## 2024-03-25 VITALS
DIASTOLIC BLOOD PRESSURE: 63 MMHG | HEART RATE: 66 BPM | OXYGEN SATURATION: 99 % | TEMPERATURE: 98.4 F | SYSTOLIC BLOOD PRESSURE: 148 MMHG | RESPIRATION RATE: 19 BRPM

## 2024-03-25 DIAGNOSIS — S39.012A STRAIN OF LUMBAR REGION, INITIAL ENCOUNTER: Primary | ICD-10-CM

## 2024-03-25 PROCEDURE — 96372 THER/PROPH/DIAG INJ SC/IM: CPT

## 2024-03-25 PROCEDURE — 6370000000 HC RX 637 (ALT 250 FOR IP): Performed by: EMERGENCY MEDICINE

## 2024-03-25 PROCEDURE — 72100 X-RAY EXAM L-S SPINE 2/3 VWS: CPT

## 2024-03-25 PROCEDURE — 6360000002 HC RX W HCPCS: Performed by: EMERGENCY MEDICINE

## 2024-03-25 PROCEDURE — 99284 EMERGENCY DEPT VISIT MOD MDM: CPT

## 2024-03-25 RX ORDER — LIDOCAINE 4 G/G
1 PATCH TOPICAL
Status: DISCONTINUED | OUTPATIENT
Start: 2024-03-25 | End: 2024-03-25 | Stop reason: HOSPADM

## 2024-03-25 RX ORDER — HYDROCODONE BITARTRATE AND ACETAMINOPHEN 5; 325 MG/1; MG/1
1 TABLET ORAL
Status: COMPLETED | OUTPATIENT
Start: 2024-03-25 | End: 2024-03-25

## 2024-03-25 RX ORDER — IBUPROFEN 600 MG/1
600 TABLET ORAL EVERY 6 HOURS PRN
Qty: 60 TABLET | Refills: 0 | Status: SHIPPED | OUTPATIENT
Start: 2024-03-25

## 2024-03-25 RX ORDER — ACETAMINOPHEN 500 MG
500 TABLET ORAL 4 TIMES DAILY PRN
Qty: 60 TABLET | Refills: 0 | Status: SHIPPED | OUTPATIENT
Start: 2024-03-25

## 2024-03-25 RX ORDER — LIDOCAINE 4 G/G
1 PATCH TOPICAL DAILY
Qty: 30 PATCH | Refills: 0 | Status: SHIPPED | OUTPATIENT
Start: 2024-03-25 | End: 2024-04-24

## 2024-03-25 RX ORDER — KETOROLAC TROMETHAMINE 30 MG/ML
30 INJECTION, SOLUTION INTRAMUSCULAR; INTRAVENOUS ONCE
Status: COMPLETED | OUTPATIENT
Start: 2024-03-25 | End: 2024-03-25

## 2024-03-25 RX ADMIN — KETOROLAC TROMETHAMINE 30 MG: 30 INJECTION INTRAMUSCULAR; INTRAVENOUS at 12:14

## 2024-03-25 RX ADMIN — HYDROCODONE BITARTRATE AND ACETAMINOPHEN 1 TABLET: 5; 325 TABLET ORAL at 14:06

## 2024-03-25 ASSESSMENT — PAIN DESCRIPTION - DESCRIPTORS: DESCRIPTORS: SHARP;SPASM

## 2024-03-25 ASSESSMENT — ENCOUNTER SYMPTOMS
BACK PAIN: 1
VOMITING: 0
COUGH: 0
ABDOMINAL PAIN: 0
WHEEZING: 0
SHORTNESS OF BREATH: 0
NAUSEA: 0

## 2024-03-25 ASSESSMENT — PAIN DESCRIPTION - ONSET: ONSET: SUDDEN

## 2024-03-25 ASSESSMENT — PAIN DESCRIPTION - LOCATION
LOCATION: BACK

## 2024-03-25 ASSESSMENT — PAIN DESCRIPTION - ORIENTATION
ORIENTATION: LOWER;MID
ORIENTATION: MID;LOWER

## 2024-03-25 ASSESSMENT — PAIN SCALES - GENERAL
PAINLEVEL_OUTOF10: 5
PAINLEVEL_OUTOF10: 8
PAINLEVEL_OUTOF10: 5
PAINLEVEL_OUTOF10: 8

## 2024-03-25 ASSESSMENT — PAIN - FUNCTIONAL ASSESSMENT
PAIN_FUNCTIONAL_ASSESSMENT: INTOLERABLE, UNABLE TO DO ANY ACTIVE OR PASSIVE ACTIVITIES
PAIN_FUNCTIONAL_ASSESSMENT: 0-10

## 2024-03-25 ASSESSMENT — PAIN DESCRIPTION - FREQUENCY: FREQUENCY: CONTINUOUS

## 2024-03-25 ASSESSMENT — PAIN DESCRIPTION - PAIN TYPE: TYPE: ACUTE PAIN

## 2024-03-25 NOTE — ED TRIAGE NOTES
Pt rpts at approx 0900 today she bent over in front of the dog food bucket and \"felt a pop\" in her lower back.  She was able to walk to the sofa and laid down.  Pt has not taken anything for pain.  Pt able to get out of the wheelchair without assistance.

## 2024-03-25 NOTE — ED NOTES
The patient was discharged home by provider in stable condition. The patient is alert and oriented, in no respiratory distress.  The patient's diagnosis, condition and treatment were explained. The patient expressed understanding. Three prescriptions given. No work/school note given. A discharge plan has been developed. A  was not involved in the process. Aftercare instructions were given.  Pt discharged from the ED via w/c by Hailee Valero RN with family.

## 2024-03-25 NOTE — ED PROVIDER NOTES
[facility] EMERGENCY DEPT  EMERGENCY DEPARTMENT ENCOUNTER      Pt Name: Bee Rick  MRN: [id number]  Birthdate 2000  Date of evaluation: 3/25/2024  Provider: Bessy Posadas MD    CHIEF COMPLAINT       Chief Complaint   Patient presents with    Back Pain         HISTORY OF PRESENT ILLNESS    HPI    Bee Rick is a 23 y.o. female with no significant past medical history who presents to the emergency department with complaints of back strain after bending over this morning to eat dogs.  Denies trauma.  Denies fall.  Patient reports some pain in low back with popping sensation starting at 9 AM today.  Patient reports she was able to get to the couch, walk to the couch, sat down and then passed out from pain.  Denies any recent illness, fever, chills, nausea, vomiting, chest pain, shortness of breath.  No pain medication taken prior to arrival.  No other complaints.  Denies any recent illness, fever, chills.  Denies any history of similar back pain.  Denies saddle paresthesia.  Denies pregnancy.  Denies loss of bladder or bowel function.  Nursing Notes were reviewed.    REVIEW OF SYSTEMS       Review of Systems   Constitutional:  Negative for chills and fever.   Respiratory:  Negative for cough, shortness of breath and wheezing.    Cardiovascular:  Negative for chest pain.   Gastrointestinal:  Negative for abdominal pain, nausea and vomiting.   Genitourinary:  Negative for difficulty urinating and flank pain.   Musculoskeletal:  Positive for back pain.   Skin:  Negative for wound.   Neurological:  Negative for headaches.   Psychiatric/Behavioral:  Negative for suicidal ideas.            PAST MEDICAL HISTORY     Past Medical History:   Diagnosis Date    Abnormal Pap smear of cervix 09/26/2019    LGSIL -> Rpt pap in 1yr     HPV vaccine counseling     Never rec'd Gardasil series    Hypothyroidism 2018    Routine Papanicolaou smear 11/11/2021    normal    Syncope          SURGICAL HISTORY       Past Surgical History: